# Patient Record
Sex: FEMALE | URBAN - METROPOLITAN AREA
[De-identification: names, ages, dates, MRNs, and addresses within clinical notes are randomized per-mention and may not be internally consistent; named-entity substitution may affect disease eponyms.]

---

## 2017-03-28 ENCOUNTER — APPOINTMENT (RX ONLY)
Dept: URBAN - METROPOLITAN AREA OTHER 12 | Facility: OTHER | Age: 57
Setting detail: DERMATOLOGY
End: 2017-03-28

## 2017-03-28 DIAGNOSIS — B35.1 TINEA UNGUIUM: ICD-10-CM

## 2017-03-28 PROCEDURE — 99201: CPT

## 2017-03-28 PROCEDURE — ? NAIL CLIPPING FOR PAS

## 2017-03-28 PROCEDURE — ? ORDER TESTS

## 2017-03-28 PROCEDURE — ? COUNSELING

## 2017-03-28 PROCEDURE — ? TREATMENT REGIMEN

## 2017-03-28 ASSESSMENT — LOCATION ZONE DERM: LOCATION ZONE: TOENAIL

## 2017-03-28 ASSESSMENT — LOCATION SIMPLE DESCRIPTION DERM: LOCATION SIMPLE: LEFT 3RD TOE

## 2017-03-28 ASSESSMENT — LOCATION DETAILED DESCRIPTION DERM: LOCATION DETAILED: LEFT 3RD TOENAIL

## 2017-03-28 NOTE — PROCEDURE: TREATMENT REGIMEN
Plan: Discussed treatment option with oral Lamisil for 12 weeks, pending PAS nail stain
Detail Level: Simple

## 2017-03-28 NOTE — HPI: BODY LOCATION - FEET
How Severe Is Your Condition?: moderate
Additional History: Patient reports she has treated with cream in [place] about 5 years ago for 6 months with clearance.

## 2017-04-15 ENCOUNTER — RX ONLY (OUTPATIENT)
Age: 57
Setting detail: RX ONLY
End: 2017-04-15

## 2017-04-15 RX ORDER — TERBINAFINE HYDROCHLORIDE 250 MG/1
TABLET ORAL
Qty: 90 | Refills: 0 | Status: ERX | COMMUNITY
Start: 2017-04-15

## 2024-01-02 NOTE — PROCEDURE: NAIL CLIPPING FOR PAS
done
Lab Facility: [id number]
Detail Level: Detailed
Add [id number] To Bill?: No
Lab: -[id number]
Billing Type: Third-Party Bill